# Patient Record
Sex: MALE | Race: WHITE | NOT HISPANIC OR LATINO | URBAN - METROPOLITAN AREA
[De-identification: names, ages, dates, MRNs, and addresses within clinical notes are randomized per-mention and may not be internally consistent; named-entity substitution may affect disease eponyms.]

---
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
## 2021-02-07 ENCOUNTER — OUTPATIENT (OUTPATIENT)
Dept: OUTPATIENT SERVICES | Facility: HOSPITAL | Age: 21
LOS: 1 days | End: 2021-02-07

## 2021-02-07 DIAGNOSIS — Z20.822 CONTACT WITH AND (SUSPECTED) EXPOSURE TO COVID-19: ICD-10-CM

## 2021-02-07 LAB — SARS-COV-2 RNA SPEC QL NAA+PROBE: SIGNIFICANT CHANGE UP

## 2021-03-06 ENCOUNTER — RESULT REVIEW (OUTPATIENT)
Age: 21
End: 2021-03-06

## 2021-03-06 ENCOUNTER — OUTPATIENT (OUTPATIENT)
Dept: OUTPATIENT SERVICES | Age: 21
LOS: 1 days | End: 2021-03-06

## 2021-03-06 ENCOUNTER — APPOINTMENT (OUTPATIENT)
Dept: MRI IMAGING | Facility: HOSPITAL | Age: 21
End: 2021-03-06
Payer: COMMERCIAL

## 2021-03-06 ENCOUNTER — APPOINTMENT (OUTPATIENT)
Dept: MRI IMAGING | Facility: HOSPITAL | Age: 21
End: 2021-03-06

## 2021-03-06 DIAGNOSIS — C72.0 MALIGNANT NEOPLASM OF SPINAL CORD: ICD-10-CM

## 2021-03-06 PROCEDURE — 72156 MRI NECK SPINE W/O & W/DYE: CPT | Mod: 26

## 2021-03-06 PROCEDURE — 72157 MRI CHEST SPINE W/O & W/DYE: CPT | Mod: 26

## 2021-03-06 PROCEDURE — 72158 MRI LUMBAR SPINE W/O & W/DYE: CPT | Mod: 26

## 2021-03-10 ENCOUNTER — RESULT REVIEW (OUTPATIENT)
Age: 21
End: 2021-03-10

## 2021-04-06 ENCOUNTER — NON-APPOINTMENT (OUTPATIENT)
Age: 21
End: 2021-04-06

## 2021-04-13 ENCOUNTER — APPOINTMENT (OUTPATIENT)
Dept: PEDIATRIC HEMATOLOGY/ONCOLOGY | Facility: CLINIC | Age: 21
End: 2021-04-13
Payer: COMMERCIAL

## 2021-04-13 ENCOUNTER — APPOINTMENT (OUTPATIENT)
Dept: PEDIATRIC NEUROLOGY | Facility: CLINIC | Age: 21
End: 2021-04-13
Payer: COMMERCIAL

## 2021-04-13 ENCOUNTER — NON-APPOINTMENT (OUTPATIENT)
Age: 21
End: 2021-04-13

## 2021-04-13 VITALS
SYSTOLIC BLOOD PRESSURE: 122 MMHG | WEIGHT: 142 LBS | BODY MASS INDEX: 19.02 KG/M2 | HEART RATE: 77 BPM | DIASTOLIC BLOOD PRESSURE: 66 MMHG | HEIGHT: 72.44 IN

## 2021-04-13 VITALS
DIASTOLIC BLOOD PRESSURE: 66 MMHG | HEIGHT: 72.44 IN | HEART RATE: 77 BPM | SYSTOLIC BLOOD PRESSURE: 122 MMHG | WEIGHT: 142 LBS | BODY MASS INDEX: 19.02 KG/M2

## 2021-04-13 PROCEDURE — 99072 ADDL SUPL MATRL&STAF TM PHE: CPT

## 2021-04-13 PROCEDURE — 99215 OFFICE O/P EST HI 40 MIN: CPT

## 2021-04-13 PROCEDURE — 99214 OFFICE O/P EST MOD 30 MIN: CPT

## 2021-04-14 NOTE — PHYSICAL EXAM
[Scars ___] : scars [unfilled] [Gait normal] : gait normal [PERRLA] : KRISTIAN [EOMI] : EOMI  [Sensory Exam intact] : sensory exam intact [Normal Patellar (DTR)] : normal patellar (DTR) [No Dysmetria] : no dysmetria  [Normal gait] : normal gait  [Normal] : affect appropriate [90: Able to carry normal activity; minor signs or symptoms of disease.] : 90: Able to carry normal activity; minor signs or symptoms of disease.  [de-identified] : atrophy of muscles in right hand-mild, left hand minimal [de-identified] : right hand 5/5 at wrist, 5-/5 right forefinger, more notable after 3-4 seconds .   5- - /5 strength in spreading of  middle finger  on right. All other extremities 5/5

## 2021-04-14 NOTE — CONSULT LETTER
[Dear  ___] : Dear  [unfilled], [Consult Letter:] : I had the pleasure of evaluating your patient, [unfilled]. [Please see my note below.] : Please see my note below. [Consult Closing:] : Thank you very much for allowing me to participate in the care of this patient.  If you have any questions, please do not hesitate to contact me. [Sincerely,] : Sincerely, [DrOwen  ___] : Dr. GASPAR [FreeTextEntry2] : Nathanael Manzano M.D.\par [address]\[place] [address]\par Tel.#: [phone]\par Fax #: [phone] [FreeTextEntry3] : Herbert Kirk MD \par Chief, Childhood Brain and Spinal Cord Tumor Center\par  of Pediatrics\par [facility]\par

## 2021-04-14 NOTE — HISTORY OF PRESENT ILLNESS
[No Feeding Issues] : no feeding issues at this time [de-identified] : Josemanuel presented in September 2015 a 2-week history of back and chest pain with some difficulty breathing and a several day history of morning emesis, neck pain and inability to turn head to sides and numbness/tingling in fingers with decreased hand strength.  MRI revealed an intramedullary spinal cord tumor from C5-T4 measuring 9.8 cm craniocaudad.  He had a gross total resection on 1/24/16 with pathology c/w ependymoma, grade II.  He went to [place] for rehabilitation postoperatively with significant improvement in lower extremity strength and moderate improvement in hand strength (left>right).  Hand strength continues to improve, now can curl 30# x 8 reps with right hand. [de-identified] : Transferred to [facility].  Strength continues to improve in his right hand, right forefinger > right middle finger remain weaker than other.  Though the weakness is rather mild, and tends to not be immediate, but after several seconds of flexion, they interfere with ability to  pen and weights.  Otherwise he has been well, no recent illnesses/issues. He denies back pain, neck pain, arm or leg pain, numbness/tingling or changes in bowel/bladder habits or incontinence, likewise denies headache, visual changes or difficulty hearing.\par

## 2021-04-14 NOTE — REVIEW OF SYSTEMS
[Negative] : Allergic/Immunologic [Acne] : no acne [de-identified] : hand weaker when cold [de-identified] : see HPI

## 2021-04-20 ENCOUNTER — APPOINTMENT (OUTPATIENT)
Dept: PEDIATRIC HEMATOLOGY/ONCOLOGY | Facility: CLINIC | Age: 21
End: 2021-04-20

## 2021-04-20 DIAGNOSIS — Z15.09 GENETIC SUSCEPTIBILITY TO OTHER MALIGNANT NEOPLASM: ICD-10-CM

## 2021-04-22 ENCOUNTER — APPOINTMENT (OUTPATIENT)
Dept: PEDIATRIC HEMATOLOGY/ONCOLOGY | Facility: CLINIC | Age: 21
End: 2021-04-22

## 2021-04-30 PROBLEM — Z15.09: Status: ACTIVE | Noted: 2021-04-30

## 2021-07-12 ENCOUNTER — NON-APPOINTMENT (OUTPATIENT)
Age: 21
End: 2021-07-12

## 2024-01-09 ENCOUNTER — APPOINTMENT (OUTPATIENT)
Dept: OBGYN | Facility: CLINIC | Age: 24
End: 2024-01-09
Payer: COMMERCIAL

## 2024-01-09 PROCEDURE — 99202 OFFICE O/P NEW SF 15 MIN: CPT

## 2024-01-09 PROCEDURE — 36415 COLL VENOUS BLD VENIPUNCTURE: CPT

## 2024-05-21 ENCOUNTER — APPOINTMENT (OUTPATIENT)
Dept: PEDIATRIC HEMATOLOGY/ONCOLOGY | Facility: CLINIC | Age: 24
End: 2024-05-21
Payer: COMMERCIAL

## 2024-05-21 ENCOUNTER — APPOINTMENT (OUTPATIENT)
Dept: PEDIATRIC NEUROLOGY | Facility: CLINIC | Age: 24
End: 2024-05-21
Payer: MEDICAID

## 2024-05-21 VITALS
WEIGHT: 146 LBS | BODY MASS INDEX: 20.44 KG/M2 | DIASTOLIC BLOOD PRESSURE: 54 MMHG | HEART RATE: 73 BPM | HEIGHT: 70.87 IN | SYSTOLIC BLOOD PRESSURE: 106 MMHG

## 2024-05-21 VITALS
HEART RATE: 73 BPM | HEIGHT: 70.87 IN | SYSTOLIC BLOOD PRESSURE: 106 MMHG | BODY MASS INDEX: 20.44 KG/M2 | WEIGHT: 146 LBS | DIASTOLIC BLOOD PRESSURE: 54 MMHG

## 2024-05-21 DIAGNOSIS — C72.0 MALIGNANT NEOPLASM OF SPINAL CORD: ICD-10-CM

## 2024-05-21 DIAGNOSIS — R29.898 OTHER SYMPTOMS AND SIGNS INVOLVING THE MUSCULOSKELETAL SYSTEM: ICD-10-CM

## 2024-05-21 DIAGNOSIS — C71.9 MALIGNANT NEOPLASM OF BRAIN, UNSPECIFIED: ICD-10-CM

## 2024-05-21 PROCEDURE — 99205 OFFICE O/P NEW HI 60 MIN: CPT

## 2024-05-21 PROCEDURE — 99204 OFFICE O/P NEW MOD 45 MIN: CPT

## 2024-05-21 NOTE — ASSESSMENT
[FreeTextEntry1] : Almost 25 yo man with history of C-T ependymoma (2015) s/p resection, with residual right hand > left hand reported weakness. Last spine MRI March 2021 was stable. Exam stable and unchanged atrophy of intrinsic hand muscles and weakness of hands, distal fingers more than proximal, right > left, and weakness of right hand flexion, mostly involved is index finger, otherwise non focal exam.

## 2024-05-21 NOTE — REVIEW OF SYSTEMS
[Acne] : no acne [Negative] : Allergic/Immunologic [de-identified] : hand weaker when cold [de-identified] : see HPI

## 2024-05-21 NOTE — PHYSICAL EXAM
[Scars ___] : scars [unfilled] [Gait normal] : gait normal [PERRLA] : KRISTIAN [EOMI] : EOMI  [Sensory Exam intact] : sensory exam intact [Normal Patellar (DTR)] : normal patellar (DTR) [No Dysmetria] : no dysmetria  [Normal gait] : normal gait  [Normal] : affect appropriate [de-identified] : atrophy of muscles in right hand-mild, left hand minimal [de-identified] : right hand 5/5 at wrist, 5-/5 right forefinger, more notable after 3-4 seconds .   4+- /5 strength in spreading of  middle finger  on right. All other extremities 5/5 [90: Able to carry normal activity; minor signs or symptoms of disease.] : 90: Able to carry normal activity; minor signs or symptoms of disease.

## 2024-05-21 NOTE — PHYSICAL EXAM
[Gets up on table without difficulty] : gets up on table without difficulty [No pronator drift] : no pronator drift [Normal finger tapping and fine finger movements] : normal finger tapping and fine finger movements [No abnormal involuntary movements] : no abnormal involuntary movements [de-identified] : awake, alert, in NAD [de-identified] : throat clear [de-identified] : some muscle atrophy both hands; both appear symmetric [de-identified] : some weakness of distal fingers; no other weakness detected on my manual muscle strength testing

## 2024-05-21 NOTE — REASON FOR VISIT
[Patient] : patient [Father] : father [Medical Records] : medical records [FreeTextEntry2] : In [facility]

## 2024-05-21 NOTE — PLAN
[FreeTextEntry1] : [] repeat imaging now; if stable, consider repeat imaging in 2 years, then continue with routine exams [] continue strengthening exercises for the hands F/U yearly All questions answered; family reports understanding and agree with plan

## 2024-05-21 NOTE — HISTORY OF PRESENT ILLNESS
[FreeTextEntry1] : Savanna is followed in [facility] for spinal cord ependymoma grade I. He presented in 2015 with 2-week history of back and chest pain, difficulty breathing, several days of morning emesis, neck pain and inability to turn head to sides and numbness/tingling in fingers with decreased hand strength. MRI revealed an intramedullary spinal cord tumor C5-T4 measuring 9.8 cm craniocaudal. S/P gross total resection on 16. S/P rehab at [facility]. He had significant improvement in lower extremity strength and moderate improvement in hand strength (left>right).  (mother  2016 of liver and colon CA. Mother also had history of brain tumor) Last C-T-L spine MRI 3/6/2021 No gross evidence for residual or recurrent spinal cord ependymoma  Last seen in [facility] 2021  SAVANNA reports that right hand weakness is about the same and not worse; most significant when he needs to grasp something and lift heavy things. Weakness, only from wrist, distally, mostly distal phalanxes according to SAVANNA. No other weakness, no bowel or bladder issues. SAVANNA denies headaches or back pain. SAVANNA was in [facility] and transferred to [place] in 2020, hybrid, undecided  SAVANNA was evaluated by genetics in 2021; genetic testing for cancer predisposition conditions was negative ___________________  2024  follow up Above reviewed with SAVANNA and father SAVANNA graduated. He is now in real estate Right hand weakness about the same; worse in the winter (when cold); the  is limited; works on that. Denies headaches or back pain; no bowel or bladder issues; no numbness or tingling UE or LE.

## 2024-05-21 NOTE — HISTORY OF PRESENT ILLNESS
[de-identified] : Josemanuel presented in September 2015 a 2-week history of back and chest pain with some difficulty breathing and a several day history of morning emesis, neck pain and inability to turn head to sides and numbness/tingling in fingers with decreased hand strength.  MRI revealed an intramedullary spinal cord tumor from C5-T4 measuring 9.8 cm craniocaudad.  He had a gross total resection on 1/24/16 with pathology c/w ependymoma, grade II.  He went to [place] for rehabilitation postoperatively with significant improvement in lower extremity strength and moderate improvement in hand strength (left>right).  Hand strength continues to improve, now can curl 30# x 8 reps with right hand. [de-identified] : Transferred to [facility].  Strength continues to improve in his right hand, right forefinger > right middle finger remain weaker than other.  Though the weakness is rather mild, and tends to not be immediate, but after several seconds of flexion, they interfere with ability to  pen and weights.  Otherwise he has been well, no recent illnesses/issues. He denies back pain, neck pain, arm or leg pain, numbness/tingling or changes in bowel/bladder habits or incontinence, likewise denies headache, visual changes or difficulty hearing. Hand slowly imporving, , mostly issues with .  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

## 2024-05-21 NOTE — CONSULT LETTER
[FreeTextEntry3] : Oscar Bermudez M.D\par  Pediatric neurology attending\par  Neurofibromatosis clinic Co-director\par  [facility]\par  [facility]\par  Tel: [phone]\par  Fax: [phone]\par

## 2024-05-21 NOTE — CONSULT LETTER
[Dear  ___] : Dear  [unfilled], [Consult Letter:] : I had the pleasure of evaluating your patient, [unfilled]. [Please see my note below.] : Please see my note below. [Consult Closing:] : Thank you very much for allowing me to participate in the care of this patient.  If you have any questions, please do not hesitate to contact me. [Sincerely,] : Sincerely, [FreeTextEntry2] : Nathanael Manzano M.D.\par  [address]\[place] [address]\par  Tel.#: [phone]\par  Fax #: [phone] [DrOwen  ___] : Dr. GASPAR [FreeTextEntry3] : Herbert Kirk MD \par  Chief, Childhood Brain and Spinal Cord Tumor Center\par   of Pediatrics\par  [facility]\par

## 2024-07-12 ENCOUNTER — APPOINTMENT (OUTPATIENT)
Dept: MRI IMAGING | Facility: HOSPITAL | Age: 24
End: 2024-07-12

## 2024-07-12 ENCOUNTER — OUTPATIENT (OUTPATIENT)
Dept: OUTPATIENT SERVICES | Age: 24
LOS: 1 days | End: 2024-07-12

## 2024-07-12 ENCOUNTER — RESULT REVIEW (OUTPATIENT)
Age: 24
End: 2024-07-12

## 2024-07-12 DIAGNOSIS — C71.9 MALIGNANT NEOPLASM OF BRAIN, UNSPECIFIED: ICD-10-CM

## 2024-07-30 ENCOUNTER — NON-APPOINTMENT (OUTPATIENT)
Age: 24
End: 2024-07-30

## 2024-08-05 ENCOUNTER — RESULT REVIEW (OUTPATIENT)
Age: 24
End: 2024-08-05

## 2024-09-01 ENCOUNTER — NON-APPOINTMENT (OUTPATIENT)
Age: 24
End: 2024-09-01

## 2024-09-02 ENCOUNTER — EMERGENCY (EMERGENCY)
Facility: HOSPITAL | Age: 24
LOS: 1 days | Discharge: HOME | End: 2024-09-02
Attending: EMERGENCY MEDICINE | Admitting: EMERGENCY MEDICINE

## 2024-09-02 VITALS
HEART RATE: 65 BPM | DIASTOLIC BLOOD PRESSURE: 76 MMHG | RESPIRATION RATE: 16 BRPM | OXYGEN SATURATION: 100 % | TEMPERATURE: 99 F | WEIGHT: 153 LBS | SYSTOLIC BLOOD PRESSURE: 126 MMHG | HEIGHT: 72 IN

## 2024-09-02 PROCEDURE — 99284 EMERGENCY DEPT VISIT MOD MDM: CPT
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
## 2024-09-02 NOTE — ED PROVIDER NOTE - NSFOLLOWUPINSTRUCTIONS_ED_ALL_ED_FT
You have likely sustained a concussion. For the next 2-3 weeks, you can expect to have a mild headache, nausea, dizziness, difficulty with memory. To reduce the symptoms, please be on Head Rest to reduce excessive stimulation to the brain, including lights, loud sounds, TV, and testing. In the meantime, please refrain from any activities that have risk of further head injury as this could cause permanent brain injury. Call the [facility] at [phone].      Your CT scan showed evidence of nasal bone fractures. You should follow up with ENT within the next 1-2 weeks. An urgent referral was sent and someone should contact you in 3-5 business days to schedule an appointment.        Contact a health care provider if:  Your symptoms get worse.  You have new symptoms such as any severe nosebleeds  You continue to have symptoms for more than 2 weeks.  Get help right away if:  You have severe or worsening headaches.  You have weakness or numbness in any part of your body.  Your coordination gets worse.  You vomit repeatedly.  You are sleepier.  You have convulsions or a seizure.  Your speech is slurred.  Your fatigue, confusion, or irritability gets worse.  You cannot recognize people or places.  You have neck pain.  It is difficult to wake you up.  You have unusual behavior changes.  You lose consciousness.

## 2024-09-02 NOTE — ED ADULT NURSE NOTE - NSFALLUNIVINTERV_ED_ALL_ED
Bed/Stretcher in lowest position, wheels locked, appropriate side rails in place/Call bell, personal items and telephone in reach/Instruct patient to call for assistance before getting out of bed/chair/stretcher/Non-slip footwear applied when patient is off stretcher/[place] to call system/Physically safe environment - no spills, clutter or unnecessary equipment/Purposeful proactive rounding/Room/bathroom lighting operational, light cord in reach
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
## 2024-09-02 NOTE — ED PROVIDER NOTE - PROGRESS NOTE DETAILS
EDUARD German PGY2- PERRL, EOMI. no pain with extraocular movements. visual acuity 20/20 in both eyes. peripheral vision intact EDUARD German PGY2- pupils reactive to light, EOMI. no pain with extraocular movements. visual acuity 20/20 in both eyes. peripheral vision intact EDUARD German PGY2- minimally displaced nasal bone fractures found on CT scan. no active bleeding. will dc home with ENT follow up
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
## 2024-09-02 NOTE — ED PROVIDER NOTE - PATIENT PORTAL LINK FT
You can access the FollowMyHealth Patient Portal offered by [facility] by registering at the following website: [email or web address]. By joining [facility]’s FollowMyHealth portal, you will also be able to view your health information using other applications (apps) compatible with our system.
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
## 2024-09-03 VITALS
RESPIRATION RATE: 18 BRPM | TEMPERATURE: 98 F | SYSTOLIC BLOOD PRESSURE: 122 MMHG | DIASTOLIC BLOOD PRESSURE: 70 MMHG | HEART RATE: 70 BPM | OXYGEN SATURATION: 97 %

## 2024-09-03 PROCEDURE — 70450 CT HEAD/BRAIN W/O DYE: CPT | Mod: 26,MC

## 2024-09-03 PROCEDURE — 70486 CT MAXILLOFACIAL W/O DYE: CPT | Mod: 26,MC

## 2024-09-03 NOTE — ED ADULT NURSE REASSESSMENT NOTE - NS ED NURSE REASSESS COMMENT FT1
Pt remains at baseline mental status. Denies worsening headache. Denies dizziness, nausea. Blurry vision to left eye persists. Breathing remains equal and unlabored on room air. Pending CT scans.

## 2024-09-07 ENCOUNTER — EMERGENCY (EMERGENCY)
Facility: HOSPITAL | Age: 24
LOS: 1 days | Discharge: HOME | End: 2024-09-07
Attending: STUDENT IN AN ORGANIZED HEALTH CARE EDUCATION/TRAINING PROGRAM | Admitting: STUDENT IN AN ORGANIZED HEALTH CARE EDUCATION/TRAINING PROGRAM

## 2024-09-07 VITALS
SYSTOLIC BLOOD PRESSURE: 104 MMHG | TEMPERATURE: 98 F | WEIGHT: 154.98 LBS | HEART RATE: 94 BPM | OXYGEN SATURATION: 99 % | HEIGHT: 72 IN | RESPIRATION RATE: 15 BRPM | DIASTOLIC BLOOD PRESSURE: 56 MMHG

## 2024-09-07 VITALS
TEMPERATURE: 99 F | SYSTOLIC BLOOD PRESSURE: 112 MMHG | OXYGEN SATURATION: 100 % | RESPIRATION RATE: 18 BRPM | HEART RATE: 71 BPM | DIASTOLIC BLOOD PRESSURE: 64 MMHG

## 2024-09-07 LAB
ALBUMIN SERPL ELPH-MCNC: 4.6 G/DL — SIGNIFICANT CHANGE UP (ref 3.3–5)
ALP SERPL-CCNC: 58 U/L — SIGNIFICANT CHANGE UP (ref 40–120)
ALT FLD-CCNC: 13 U/L — SIGNIFICANT CHANGE UP (ref 4–41)
ANION GAP SERPL CALC-SCNC: 16 MMOL/L — HIGH (ref 7–14)
AST SERPL-CCNC: 14 U/L — SIGNIFICANT CHANGE UP (ref 4–40)
BASOPHILS # BLD AUTO: 0.04 K/UL — SIGNIFICANT CHANGE UP (ref 0–0.2)
BASOPHILS NFR BLD AUTO: 0.9 % — SIGNIFICANT CHANGE UP (ref 0–2)
BILIRUB SERPL-MCNC: 1.2 MG/DL — SIGNIFICANT CHANGE UP (ref 0.2–1.2)
BUN SERPL-MCNC: 19 MG/DL — SIGNIFICANT CHANGE UP (ref 7–23)
CALCIUM SERPL-MCNC: 9.8 MG/DL — SIGNIFICANT CHANGE UP (ref 8.4–10.5)
CHLORIDE SERPL-SCNC: 103 MMOL/L — SIGNIFICANT CHANGE UP (ref 98–107)
CO2 SERPL-SCNC: 21 MMOL/L — LOW (ref 22–31)
CREAT SERPL-MCNC: 0.98 MG/DL — SIGNIFICANT CHANGE UP (ref 0.5–1.3)
EGFR: 110 ML/MIN/1.73M2 — SIGNIFICANT CHANGE UP
EOSINOPHIL # BLD AUTO: 0.05 K/UL — SIGNIFICANT CHANGE UP (ref 0–0.5)
EOSINOPHIL NFR BLD AUTO: 1.1 % — SIGNIFICANT CHANGE UP (ref 0–6)
GLUCOSE SERPL-MCNC: 119 MG/DL — HIGH (ref 70–99)
HCT VFR BLD CALC: 42.5 % — SIGNIFICANT CHANGE UP (ref 39–50)
HGB BLD-MCNC: 14.8 G/DL — SIGNIFICANT CHANGE UP (ref 13–17)
IANC: 3.27 K/UL — SIGNIFICANT CHANGE UP (ref 1.8–7.4)
IMM GRANULOCYTES NFR BLD AUTO: 0.2 % — SIGNIFICANT CHANGE UP (ref 0–0.9)
LYMPHOCYTES # BLD AUTO: 1.01 K/UL — SIGNIFICANT CHANGE UP (ref 1–3.3)
LYMPHOCYTES # BLD AUTO: 21.8 % — SIGNIFICANT CHANGE UP (ref 13–44)
MAGNESIUM SERPL-MCNC: 1.8 MG/DL — SIGNIFICANT CHANGE UP (ref 1.6–2.6)
MCHC RBC-ENTMCNC: 28.4 PG — SIGNIFICANT CHANGE UP (ref 27–34)
MCHC RBC-ENTMCNC: 34.8 GM/DL — SIGNIFICANT CHANGE UP (ref 32–36)
MCV RBC AUTO: 81.6 FL — SIGNIFICANT CHANGE UP (ref 80–100)
MONOCYTES # BLD AUTO: 0.25 K/UL — SIGNIFICANT CHANGE UP (ref 0–0.9)
MONOCYTES NFR BLD AUTO: 5.4 % — SIGNIFICANT CHANGE UP (ref 2–14)
NEUTROPHILS # BLD AUTO: 3.27 K/UL — SIGNIFICANT CHANGE UP (ref 1.8–7.4)
NEUTROPHILS NFR BLD AUTO: 70.6 % — SIGNIFICANT CHANGE UP (ref 43–77)
NRBC # BLD: 0 /100 WBCS — SIGNIFICANT CHANGE UP (ref 0–0)
NRBC # FLD: 0 K/UL — SIGNIFICANT CHANGE UP (ref 0–0)
PHOSPHATE SERPL-MCNC: 2.9 MG/DL — SIGNIFICANT CHANGE UP (ref 2.5–4.5)
PLATELET # BLD AUTO: 259 K/UL — SIGNIFICANT CHANGE UP (ref 150–400)
POTASSIUM SERPL-MCNC: 4.2 MMOL/L — SIGNIFICANT CHANGE UP (ref 3.5–5.3)
POTASSIUM SERPL-SCNC: 4.2 MMOL/L — SIGNIFICANT CHANGE UP (ref 3.5–5.3)
PROT SERPL-MCNC: 6.8 G/DL — SIGNIFICANT CHANGE UP (ref 6–8.3)
RBC # BLD: 5.21 M/UL — SIGNIFICANT CHANGE UP (ref 4.2–5.8)
RBC # FLD: 12 % — SIGNIFICANT CHANGE UP (ref 10.3–14.5)
SODIUM SERPL-SCNC: 140 MMOL/L — SIGNIFICANT CHANGE UP (ref 135–145)
TROPONIN T, HIGH SENSITIVITY RESULT: 12 NG/L — SIGNIFICANT CHANGE UP
WBC # BLD: 4.63 K/UL — SIGNIFICANT CHANGE UP (ref 3.8–10.5)
WBC # FLD AUTO: 4.63 K/UL — SIGNIFICANT CHANGE UP (ref 3.8–10.5)

## 2024-09-07 PROCEDURE — 99284 EMERGENCY DEPT VISIT MOD MDM: CPT

## 2024-09-07 PROCEDURE — 70450 CT HEAD/BRAIN W/O DYE: CPT | Mod: 26,MC

## 2024-09-07 PROCEDURE — 93010 ELECTROCARDIOGRAM REPORT: CPT

## 2024-09-07 RX ORDER — SODIUM CHLORIDE 9 MG/ML
1000 INJECTION INTRAMUSCULAR; INTRAVENOUS; SUBCUTANEOUS ONCE
Refills: 0 | Status: COMPLETED | OUTPATIENT
Start: 2024-09-07 | End: 2024-09-07

## 2024-09-07 RX ADMIN — SODIUM CHLORIDE 1000 MILLILITER(S): 9 INJECTION INTRAMUSCULAR; INTRAVENOUS; SUBCUTANEOUS at 12:42

## 2024-09-07 NOTE — ED PROVIDER NOTE - NSFOLLOWUPINSTRUCTIONS_ED_ALL_ED_FT
You were seen in the Emergency Department for lightheadedness. Lab and imaging results, if performed, were discussed with you along with your discharge diagnosis.    Please continue rest from screen time and sports.     Follow up with your doctor in 1 week - bring copies of your results if you were given. If you do not have a primary doctor, please call [phone] to find one convenient for you.    Continue all prescribed medications.     To control your pain at home, you should take Ibuprofen 400 mg along with Tylenol 650mg-1000mg every 6 to 8 hours. Limit your maximum daily Tylenol from all sources to 4000mg.     Return to ED for any new or worsening symptoms including but not limited to: development of chest pain, shortness of breath, fever, vomiting, focal numbness, weakness or tingling, any severe CP, headache, abdominal pain, back pain.      Rest and keep yourself hydrated with fluids
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
## 2024-09-07 NOTE — ED ADULT TRIAGE NOTE - CHIEF COMPLAINT QUOTE
pt ambulatory, c/o dizziness states was diagnosed with concussion last week and has had intermittent dizzy spells since. pt denies changes in vision, numbness/tingling, sob, chest pain, n/v. no facial droop noted, speech clear and comprehensible. denies past medical history

## 2024-09-07 NOTE — ED PROVIDER NOTE - PATIENT PORTAL LINK FT
You can access the FollowMyHealth Patient Portal offered by [facility] by registering at the following website: [email or web address]. By joining [facility]’s FollowMyHealth portal, you will also be able to view your health information using other applications (apps) compatible with our system.

## 2024-09-07 NOTE — ED PROVIDER NOTE - CLINICAL SUMMARY MEDICAL DECISION MAKING FREE TEXT BOX
Davon Cuevas MD (Attending MD):     CONSTITUTIONAL: No fevers, no chills, no lightheadedness, no dizziness  EYES: no visual changes, no eye pain  EARS: no ear drainage, no ear pain, no change in hearing  NOSE: no nasal congestion  MOUTH/THROAT: no sore throat  CV: No chest pain, no palpitations  RESP: No SOB, no cough  GI: No n/v/d, no abd pain  : no dysuria, no hematuria, no flank pain  MSK: no back pain, no extremity pain  SKIN: no rashes  NEURO: no headache, no focal weakness, no decreased sensation/parasthesias   PSYCHIATRIC: no known mental health issues    General: Alert and Orientated x 3. No apparent distress.  Head: Normocephalic and atraumatic.  Eyes: PERRLA with EOMI.  Neck: Supple. Trachea midline.   Cardiac: Normal S1 and S2 w/ RRR. No murmurs appreciated. No JVD appreciated.  Pulmonary: CTA bilaterally. No increased WOB. No wheezes or crackles.  Abdominal: Soft, non-tender. (+) bowel sounds appreciated in all 4 quadrants. No hepatosplenomegaly.   Neurologic: No focal sensory or motor deficits.  Musculoskeletal: Strength appropriate in all 4 extremities for age with no limited ROM.  Skin: Color appropriate for race. Intact, warm, and well-perfused.  Psychiatric: Appropriate mood and affect. No apparent risk to self or others.     MDM:

## 2025-05-06 ENCOUNTER — APPOINTMENT (OUTPATIENT)
Dept: PEDIATRIC HEMATOLOGY/ONCOLOGY | Facility: CLINIC | Age: 25
End: 2025-05-06
Payer: COMMERCIAL

## 2025-05-06 ENCOUNTER — APPOINTMENT (OUTPATIENT)
Dept: PEDIATRIC NEUROLOGY | Facility: CLINIC | Age: 25
End: 2025-05-06
Payer: COMMERCIAL

## 2025-05-06 VITALS
BODY MASS INDEX: 20.62 KG/M2 | HEART RATE: 86 BPM | WEIGHT: 144 LBS | HEIGHT: 70 IN | DIASTOLIC BLOOD PRESSURE: 69 MMHG | SYSTOLIC BLOOD PRESSURE: 115 MMHG

## 2025-05-06 DIAGNOSIS — R29.898 OTHER SYMPTOMS AND SIGNS INVOLVING THE MUSCULOSKELETAL SYSTEM: ICD-10-CM

## 2025-05-06 DIAGNOSIS — C71.9 MALIGNANT NEOPLASM OF BRAIN, UNSPECIFIED: ICD-10-CM

## 2025-05-06 DIAGNOSIS — D49.2 NEOPLASM OF UNSPECIFIED BEHAVIOR OF BONE, SOFT TISSUE, AND SKIN: ICD-10-CM

## 2025-05-06 DIAGNOSIS — C72.0 MALIGNANT NEOPLASM OF SPINAL CORD: ICD-10-CM

## 2025-05-06 PROCEDURE — 99214 OFFICE O/P EST MOD 30 MIN: CPT
